# Patient Record
Sex: FEMALE | Race: WHITE | NOT HISPANIC OR LATINO | Employment: OTHER | URBAN - METROPOLITAN AREA
[De-identification: names, ages, dates, MRNs, and addresses within clinical notes are randomized per-mention and may not be internally consistent; named-entity substitution may affect disease eponyms.]

---

## 2017-01-19 ENCOUNTER — OFFICE VISIT - HEALTHEAST (OUTPATIENT)
Dept: BEHAVIORAL HEALTH | Facility: CLINIC | Age: 77
End: 2017-01-19

## 2017-01-19 DIAGNOSIS — F43.22 ADJUSTMENT DISORDER WITH ANXIOUS MOOD: ICD-10-CM

## 2017-01-19 DIAGNOSIS — F39 UNSPECIFIED EPISODIC MOOD DISORDER: ICD-10-CM

## 2017-01-19 DIAGNOSIS — Z60.3: ICD-10-CM

## 2017-01-19 DIAGNOSIS — F31.70 BIPOLAR DISORDER IN REMISSION (H): ICD-10-CM

## 2017-01-19 SDOH — SOCIAL STABILITY - SOCIAL INSECURITY: ACCULTURATION DIFFICULTY: Z60.3

## 2017-01-29 ENCOUNTER — COMMUNICATION - HEALTHEAST (OUTPATIENT)
Dept: BEHAVIORAL HEALTH | Facility: CLINIC | Age: 77
End: 2017-01-29

## 2017-01-29 DIAGNOSIS — F31.70 BIPOLAR DISORDER IN REMISSION (H): ICD-10-CM

## 2017-01-29 DIAGNOSIS — F41.9 ANXIETY: ICD-10-CM

## 2017-03-03 ENCOUNTER — COMMUNICATION - HEALTHEAST (OUTPATIENT)
Dept: BEHAVIORAL HEALTH | Facility: CLINIC | Age: 77
End: 2017-03-03

## 2017-03-03 DIAGNOSIS — F41.9 ANXIETY: ICD-10-CM

## 2017-03-03 DIAGNOSIS — F31.70 BIPOLAR DISORDER IN REMISSION (H): ICD-10-CM

## 2017-03-06 ENCOUNTER — COMMUNICATION - HEALTHEAST (OUTPATIENT)
Dept: BEHAVIORAL HEALTH | Facility: CLINIC | Age: 77
End: 2017-03-06

## 2017-03-06 DIAGNOSIS — F32.A DEPRESSION: ICD-10-CM

## 2017-03-14 ENCOUNTER — COMMUNICATION - HEALTHEAST (OUTPATIENT)
Dept: BEHAVIORAL HEALTH | Facility: CLINIC | Age: 77
End: 2017-03-14

## 2017-03-23 ENCOUNTER — COMMUNICATION - HEALTHEAST (OUTPATIENT)
Dept: BEHAVIORAL HEALTH | Facility: CLINIC | Age: 77
End: 2017-03-23

## 2017-05-25 ENCOUNTER — COMMUNICATION - HEALTHEAST (OUTPATIENT)
Dept: BEHAVIORAL HEALTH | Facility: CLINIC | Age: 77
End: 2017-05-25

## 2017-05-25 ENCOUNTER — OFFICE VISIT - HEALTHEAST (OUTPATIENT)
Dept: BEHAVIORAL HEALTH | Facility: CLINIC | Age: 77
End: 2017-05-25

## 2017-05-25 ENCOUNTER — AMBULATORY - HEALTHEAST (OUTPATIENT)
Dept: LAB | Facility: CLINIC | Age: 77
End: 2017-05-25

## 2017-05-25 DIAGNOSIS — F31.70 BIPOLAR DISORDER IN REMISSION (H): ICD-10-CM

## 2017-05-25 DIAGNOSIS — Z79.899 HIGH RISK MEDICATION USE: ICD-10-CM

## 2017-05-25 DIAGNOSIS — F32.A DEPRESSION: ICD-10-CM

## 2017-05-25 ASSESSMENT — MIFFLIN-ST. JEOR: SCORE: 1259.67

## 2017-08-28 ENCOUNTER — COMMUNICATION - HEALTHEAST (OUTPATIENT)
Dept: BEHAVIORAL HEALTH | Facility: CLINIC | Age: 77
End: 2017-08-28

## 2017-08-28 DIAGNOSIS — F31.70 BIPOLAR DISORDER IN REMISSION (H): ICD-10-CM

## 2017-08-28 DIAGNOSIS — Z79.899 HIGH RISK MEDICATION USE: ICD-10-CM

## 2017-09-21 ENCOUNTER — OFFICE VISIT - HEALTHEAST (OUTPATIENT)
Dept: BEHAVIORAL HEALTH | Facility: CLINIC | Age: 77
End: 2017-09-21

## 2017-09-21 DIAGNOSIS — F31.70 BIPOLAR DISORDER IN REMISSION (H): ICD-10-CM

## 2017-09-21 ASSESSMENT — MIFFLIN-ST. JEOR: SCORE: 1130.4

## 2018-03-13 ENCOUNTER — OFFICE VISIT - HEALTHEAST (OUTPATIENT)
Dept: BEHAVIORAL HEALTH | Facility: CLINIC | Age: 78
End: 2018-03-13

## 2018-03-13 DIAGNOSIS — Z60.3: ICD-10-CM

## 2018-03-13 DIAGNOSIS — Z79.899 HIGH RISK MEDICATION USE: ICD-10-CM

## 2018-03-13 DIAGNOSIS — F43.22 ADJUSTMENT DISORDER WITH ANXIOUS MOOD: ICD-10-CM

## 2018-03-13 DIAGNOSIS — F31.70 BIPOLAR DISORDER IN REMISSION (H): ICD-10-CM

## 2018-03-13 SDOH — SOCIAL STABILITY - SOCIAL INSECURITY: ACCULTURATION DIFFICULTY: Z60.3

## 2018-03-13 ASSESSMENT — MIFFLIN-ST. JEOR: SCORE: 1134.94

## 2018-04-24 ENCOUNTER — AMBULATORY - HEALTHEAST (OUTPATIENT)
Dept: BEHAVIORAL HEALTH | Facility: CLINIC | Age: 78
End: 2018-04-24

## 2018-04-24 ENCOUNTER — COMMUNICATION - HEALTHEAST (OUTPATIENT)
Dept: BEHAVIORAL HEALTH | Facility: CLINIC | Age: 78
End: 2018-04-24

## 2018-04-24 DIAGNOSIS — Z79.899 HIGH RISK MEDICATION USE: ICD-10-CM

## 2018-04-24 DIAGNOSIS — F31.70 BIPOLAR DISORDER IN REMISSION (H): ICD-10-CM

## 2018-09-11 ENCOUNTER — OFFICE VISIT - HEALTHEAST (OUTPATIENT)
Dept: BEHAVIORAL HEALTH | Facility: CLINIC | Age: 78
End: 2018-09-11

## 2018-09-11 DIAGNOSIS — F31.70 BIPOLAR DISORDER IN REMISSION (H): ICD-10-CM

## 2018-09-11 DIAGNOSIS — Z79.899 HIGH RISK MEDICATION USE: ICD-10-CM

## 2018-09-11 DIAGNOSIS — F43.21 GRIEF: ICD-10-CM

## 2018-09-11 ASSESSMENT — MIFFLIN-ST. JEOR: SCORE: 1144.01

## 2019-03-05 ENCOUNTER — OFFICE VISIT - HEALTHEAST (OUTPATIENT)
Dept: BEHAVIORAL HEALTH | Facility: CLINIC | Age: 79
End: 2019-03-05

## 2019-03-05 DIAGNOSIS — F31.70 BIPOLAR DISORDER IN REMISSION (H): ICD-10-CM

## 2019-03-05 DIAGNOSIS — K59.00 CONSTIPATION, UNSPECIFIED CONSTIPATION TYPE: ICD-10-CM

## 2019-03-05 DIAGNOSIS — Z79.899 HIGH RISK MEDICATION USE: ICD-10-CM

## 2019-03-05 ASSESSMENT — MIFFLIN-ST. JEOR: SCORE: 1157.61

## 2019-09-17 ENCOUNTER — OFFICE VISIT - HEALTHEAST (OUTPATIENT)
Dept: BEHAVIORAL HEALTH | Facility: CLINIC | Age: 79
End: 2019-09-17

## 2019-09-17 DIAGNOSIS — F31.70 BIPOLAR DISORDER IN REMISSION (H): ICD-10-CM

## 2019-09-17 DIAGNOSIS — Z79.899 HIGH RISK MEDICATION USE: ICD-10-CM

## 2019-09-17 DIAGNOSIS — G47.00 INSOMNIA, UNSPECIFIED TYPE: ICD-10-CM

## 2019-09-17 DIAGNOSIS — K59.00 CONSTIPATION, UNSPECIFIED CONSTIPATION TYPE: ICD-10-CM

## 2019-09-17 ASSESSMENT — ANXIETY QUESTIONNAIRES
GAD7 TOTAL SCORE: 7
7. FEELING AFRAID AS IF SOMETHING AWFUL MIGHT HAPPEN: SEVERAL DAYS
6. BECOMING EASILY ANNOYED OR IRRITABLE: SEVERAL DAYS
1. FEELING NERVOUS, ANXIOUS, OR ON EDGE: NOT AT ALL
4. TROUBLE RELAXING: MORE THAN HALF THE DAYS
2. NOT BEING ABLE TO STOP OR CONTROL WORRYING: SEVERAL DAYS
3. WORRYING TOO MUCH ABOUT DIFFERENT THINGS: MORE THAN HALF THE DAYS
5. BEING SO RESTLESS THAT IT IS HARD TO SIT STILL: NOT AT ALL

## 2019-09-17 ASSESSMENT — PATIENT HEALTH QUESTIONNAIRE - PHQ9: SUM OF ALL RESPONSES TO PHQ QUESTIONS 1-9: 1

## 2019-09-17 ASSESSMENT — MIFFLIN-ST. JEOR: SCORE: 1148.77

## 2020-08-18 ENCOUNTER — OFFICE VISIT - HEALTHEAST (OUTPATIENT)
Dept: BEHAVIORAL HEALTH | Facility: CLINIC | Age: 80
End: 2020-08-18

## 2020-08-18 DIAGNOSIS — F31.70 BIPOLAR DISORDER IN REMISSION (H): ICD-10-CM

## 2020-08-18 DIAGNOSIS — G47.00 INSOMNIA, UNSPECIFIED TYPE: ICD-10-CM

## 2020-08-18 DIAGNOSIS — Z79.899 HIGH RISK MEDICATION USE: ICD-10-CM

## 2020-08-18 DIAGNOSIS — F43.21 GRIEF: ICD-10-CM

## 2020-08-18 ASSESSMENT — MIFFLIN-ST. JEOR: SCORE: 1175.76

## 2021-01-15 ENCOUNTER — COMMUNICATION - HEALTHEAST (OUTPATIENT)
Dept: BEHAVIORAL HEALTH | Facility: CLINIC | Age: 81
End: 2021-01-15

## 2021-05-26 ASSESSMENT — PATIENT HEALTH QUESTIONNAIRE - PHQ9: SUM OF ALL RESPONSES TO PHQ QUESTIONS 1-9: 1

## 2021-05-28 ENCOUNTER — COMMUNICATION - HEALTHEAST (OUTPATIENT)
Dept: BEHAVIORAL HEALTH | Facility: CLINIC | Age: 81
End: 2021-05-28

## 2021-05-28 ASSESSMENT — ANXIETY QUESTIONNAIRES: GAD7 TOTAL SCORE: 7

## 2021-05-31 VITALS — WEIGHT: 161 LBS | BODY MASS INDEX: 23.85 KG/M2 | HEIGHT: 69 IN

## 2021-05-31 VITALS — WEIGHT: 164 LBS | BODY MASS INDEX: 32.2 KG/M2 | HEIGHT: 60 IN

## 2021-06-01 VITALS — HEIGHT: 60 IN | WEIGHT: 165 LBS | BODY MASS INDEX: 32.39 KG/M2

## 2021-06-01 NOTE — PROGRESS NOTES
OUTPATIENT PROGRESS NOTE      Date of visit 2019     There have not been many changes since last visit on   3/5/2019 in reasons for visit, issues, HPI, objective , subjective, family history, mental status examination, procedures and coordination of care, diagnosis and treatment plan, as the patient is stable in the context of compliance and regularly scheduled follow up, so the note was partially copied from previous visit.          [place] name  Alireza Kenyon    Reasons For Visit Are Multiple Today:   1.  Culturally sensitive follow-up for mental health issues  2. Depression: no symptoms   3. Anxiety : no symptoms  4. Insomnia : no symptoms, Trazodone helps  5. ARETHA inventory: score 7, but the patient denies all symptoms pertinent to anxiety, so elevated score could be due to just cultural differences or losses in translation  6. PHQ9 inventory score 1,  the patient denies symptoms pertinent for depression  7. [place] cultural issues: The interview is in [place] language  8.  Grief and loss issues:  of 59 years  last year, the patient has completed grief process and doing well in this regard at this time  9.  Ego activities: She attends [place]an-speaking adult  3-4 times per week  10.  Renewal of prescriptions  11.  Complaint of constipation: We discussed natural treatment modalities, see below, the patient occasionally takes MiraLAX and Dulcolax, limited response today  12.  Education on future follow-up appointments  13.  Education on length of treatment: I advised the patient to discontinue Effexor or at least to try to discontinue Effexor, in my opinion her multiple symptoms of depression will due to her caretakers burden as her  was very ill several years before his death, and she was his main caretaker.  However the patient is afraid to stop it, she has no side effects, and she wants to continue it without change, she does not want to risk the recurrence of  symptoms.      History of present illness/Subjective:  The patient is Welsh speaking. The interview is conducted in Welsh language, translated personally by me into English records which adds additional element of complexity to this visit and my assessment.  The patient is doing very well.  She is nicely dressed and groomed in typical [place] attire.  She is adequately engaging.  She denies side effects with medications.  She does not want to change her regimen.  She has pleasant demeanor.  She is very attentive, bright, but somewhat overly focused on the events of remote past today.  I allowed her to reflect on her life in [place] during communist times as it is very important in [place] immigrant culture, due to previous traumatic as well as pleasant experiences, and nostalgic feelings.  It is believed to be therapeutic for the patient.        Medications:      Current Outpatient Medications   Medication Sig Dispense Refill     aspirin 81 MG EC tablet Take 81 mg by mouth daily with supper. In the evening per the patient.       bisacodyl (DULCOLAX) 5 mg EC tablet Take 10 mg by mouth as needed.       cholecalciferol, vitamin D3, 1,000 unit tablet Take 1,000 Units by mouth daily.       cyanocobalamin 1000 MCG tablet Take 1,000 mcg by mouth daily.       docusate sodium (COLACE) 100 MG capsule Take 100 mg by mouth 2 (two) times a day.        flaxseed oil 1,000 mg cap Take 1 each by mouth 2 (two) times a day. 60 each 0     hydrochlorothiazide (MICROZIDE) 12.5 mg capsule Take 12.5 mg by mouth daily.       latanoprost (XALATAN) 0.005 % ophthalmic solution Apply 1 drop to eye at bedtime.       levothyroxine (SYNTHROID, LEVOTHROID) 75 MCG tablet Take 75 mcg by mouth daily.       lisinopril (PRINIVIL,ZESTRIL) 10 MG tablet Take 10 mg by mouth 2 (two) times a day.              polyethylene glycol (MIRALAX) 17 gram packet Take 17 g by mouth daily as needed.              psyllium husk (METAMUCIL) 0.4 gram cap Take 1 each by  mouth 2 (two) times a day. 60 capsule 0     traZODone (DESYREL) 50 MG tablet Take 50 mg by mouth. Per Dr. Loomis       venlafaxine (EFFEXOR XR) 75 MG 24 hr capsule Take 1 capsule (75 mg total) by mouth daily. 90 capsule 1     cyanocobalamin 1,000 mcg/mL injection Inject 1,000 mcg into the shoulder, thigh, or buttocks once. Per Dr. Loomis       No current facility-administered medications for this visit.          Family history/Social history  She lives alone.  She receives PCA services daily.          Procedures: Complex visit as multiple issues were addressed, total of  13 .  Total time today is 25 minutes, face to face and direct hands on patient's care in the clinic, more than 50% of time was coordination of care.   1. Coordination of care: nursing notes, vital signs,  communication with the pharmacy, and  medication orders were reviewed signed and discussed with the patient. Multiple chart entries were reviewed in preparation of documentation and generation of documentation.  2.  Education: was provided on diagnosis, medication regimen, psychotherapeutic treatment modalities, future follow-up appointments.  3.  Counseling: was provided on coping with mental illness.  4.  Coordination of care: I personally coordinated all medication orders, follow up orders, pharmacy requests, and provided the patient with detailed instructions in [place] language  5. [place] cultural and immigration issues were addressed, the interview was conducted in [place]arian language, Gibraltarian medications were discussed as it is common in [place] speaking community to take Russian produced medications which could be dangerous. The patient takes valerian root during spikes of blood pressure, which is a commonly used remedy in [place] culture    Review Of Systems:  As above.   The reminder of 10 systems was negative.      Vital Signs:    /84 (Patient Site: Left Arm, Patient Position: Sitting, Cuff Size: Adult Regular)   Pulse 72   Temp  98.1  F (36.7  C) (Oral)   Resp 16   Ht 5' (1.524 m)   Wt 168 lb 0.8 oz (76.2 kg)   BMI 32.82 kg/m      Mental Status Examination:     Appearance   Pleasant, nicely groomed.  Alert and oriented x3.  Alert and oriented ×3  Speech  fluent  Mood  good  Affect pleasant  Thought processing logical associations tight, thought content no SI/HI/psychosis.  No pathology.  Language normal.  Excellent short-term and long-term memory.  Fund of knowledge normal.  Psychomotor activity normal.  Gait and station are stable.  Insight and judgment are normal.        Laboratory Data:     personally reviewed.   No visits with results within 2 Month(s) from this visit.   Latest known visit with results is:   Lab on 05/25/2017   Component Date Value     Bilirubin, Total 05/25/2017 0.3      Bilirubin, Direct 05/25/2017 0.1      Protein, Total 05/25/2017 7.2      Albumin 05/25/2017 3.9      Alkaline Phosphatase 05/25/2017 70      AST 05/25/2017 16      ALT 05/25/2017 18      WBC 05/25/2017 8.7      RBC 05/25/2017 4.42      Hemoglobin 05/25/2017 13.6      Hematocrit 05/25/2017 43.2      MCV 05/25/2017 98      MCH 05/25/2017 30.8      MCHC 05/25/2017 31.5*     RDW 05/25/2017 12.8      Platelets 05/25/2017 300      MPV 05/25/2017 10.4      Neutrophils % 05/25/2017 59      Lymphocytes % 05/25/2017 30      Monocytes % 05/25/2017 9      Eosinophils % 05/25/2017 2      Basophils % 05/25/2017 0      Neutrophils Absolute 05/25/2017 5.1      Lymphocytes Absolute 05/25/2017 2.6      Monocytes Absolute 05/25/2017 0.8      Eosinophils Absolute 05/25/2017 0.2      Basophils Absolute 05/25/2017 0.0          Diagnosis:  No past medical history on file.    Patient Active Problem List   Diagnosis     Anxiety state, unspecified     Other dysfunctions of sleep stages or arousal from sleep       Visit diagnosis:  Bipolar disorder, in remission  Insomnia, stable on trazodone  Constipation      Treatment Plan:  1. The patient was provided with education and  detailed written and verbal instructions in native language on diagnosis, future follow-up, and treatment plan, same instructions were also provided in English language.   2. Instructed to return to clinic in 6 months or sooner if needed.  3. Nurse only clinic is available in the interim if needed.  4. Crisis plan in place.  5.   Continue Metamucil twice daily and flaxseed twice daily for constipation  6.  Follow-up with primary physician as previously scheduled Dr. Loomis   7.  We again discussed the risk of precipitation of derek when an antidepressant medication is used in bipolar depression unopposed with a mood stabilizer, but the patient continues to decline a mood stabilizing medication, she has been doing on this combination very well for at least couple of years, so we decided to continue it for now, and crisis plan was discussed, she will call the clinic in case of recurrence of symptoms of derek or mixed symptoms          This note was created by undersigned using a Dragon dictation system. All typing errors or contextual distortion are unintentional and software inherent.     Mili Rasmussen MD

## 2021-06-01 NOTE — PROGRESS NOTES
Reason for visit - follow-up, has issues to address with Dr. Rasmussen, not with nurse.  Sleep - dreams every night, sleep and will wake up, dreams are good.  Depression - low.  Anxiety - low.  Panic attacks - none.  SI/HI - denies.  Therapist - not for quite awhile.  Side effects from medication - none noted from the Venlafaxine.    No medication to report on [place] .

## 2021-06-01 NOTE — PROGRESS NOTES
Correct pharmacy verified with patient and confirmed in snapshot? [x] yes []no    Charge captured ? [x] yes  [] no    Medications Phoned  to Pharmacy [] yes [x]no  Name of Pharmacist:  List Medications, including dose, quantity and instructions      Medication Prescriptions given to patient   [] yes  [x] no   List the name of the drug the prescription was written for.       Medications ordered this visit were e-scribed.  Verified by order class [x] yes  [] no    Medication changes or discontinuations were communicated to patient's pharmacy: [] yes  [x] no, no medication discontinued.    UA collected [] yes  [x] no    [place] Prescription Monitoring Program Reviewed? [] yes  [x] no, no medication to monitor on the [place] .    Referrals were made to:  NA    Future appointment was made: [x] yes  [] no    Dictation completed at time of chart check: [x] yes  [] no    I have checked the documentation for today s encounters and the above information has been reviewed and completed.

## 2021-06-02 VITALS — WEIGHT: 170 LBS | BODY MASS INDEX: 33.38 KG/M2 | HEIGHT: 60 IN

## 2021-06-02 VITALS — BODY MASS INDEX: 32.79 KG/M2 | WEIGHT: 167 LBS | HEIGHT: 60 IN

## 2021-06-03 VITALS
WEIGHT: 168.05 LBS | DIASTOLIC BLOOD PRESSURE: 84 MMHG | RESPIRATION RATE: 16 BRPM | SYSTOLIC BLOOD PRESSURE: 140 MMHG | TEMPERATURE: 98.1 F | BODY MASS INDEX: 32.99 KG/M2 | HEART RATE: 72 BPM | HEIGHT: 60 IN

## 2021-06-04 VITALS
WEIGHT: 174 LBS | DIASTOLIC BLOOD PRESSURE: 68 MMHG | HEIGHT: 60 IN | HEART RATE: 67 BPM | SYSTOLIC BLOOD PRESSURE: 143 MMHG | BODY MASS INDEX: 34.16 KG/M2

## 2021-06-08 NOTE — PROGRESS NOTES
OUTPATIENT PROGRESS NOTE      Date of visit January 19, 2017           Reasons For Visit Are Multiple Today:   1. Recent visit of her son's family from [place], son, daughter-in-law, and 2 granddaughter's  2.  Problems with memory, forgetfulness, word finding difficulties  3.  Education on treatment plan  4.  Education on medication changes  5.  Symptoms of bipolar disorder: Stable at this time  6. Education on future follow up appointments  7.  Bulgarian and Temple cultural issues: Multiple concerns about 16 years old granddaughter Belle who is also diagnosed with depression and ADD and being treated for it  8. Limited stress tolerance, improved with resolution of affective dysregulation  9. Questions about medications        History of present illness/Subjective:  The patient is Bulgarian speaking. The interview is conducted in Bulgarian language, translated personally by me into English records which adds additional element of complexity to this visit and assessment. she reflects at length about on issues with her granddaughter, tearful, worries a lot about her, which is common in [place] Temple community, asking me a lot of questions about childhood depression and ADD.  So I allowed the patient to process her concerns and provided her with supportive interaction.  She also complains of short-term memory difficulties, forgetfulness, word finding difficulties.  We discussed the possibility of Klonopin contributing to the above symptoms.  I educated the patient on general contraindication for long-term use and use in elderly due to risk of accumulative effect, possibility of cognitive deficits, confusion, falls, rebound anxiety, seizures with abrupt discontinuation, potential for accumulation in elderly population.  I suggested to start tapering it.  Educated the patient on taper and risk of seizures with abrupt discontinuation, as she is known to often self manage her medications and doses.     the patient says that she  takes 0.5 mg twice per day for the most part, sometimes forgets to take morning dose.     Medications:      Current Outpatient Prescriptions   Medication Sig Dispense Refill     aspirin 81 MG EC tablet Take 81 mg by mouth daily with supper. In the evening per the patient.       cholecalciferol, vitamin D3, 1,000 unit tablet Take 1,000 Units by mouth daily.       clonazePAM (KLONOPIN) 0.5 MG tablet Take 1 tablet (0.5 mg total) by mouth 2 (two) times a day. 60 tablet 5     hydrochlorothiazide (MICROZIDE) 12.5 mg capsule Take 12.5 mg by mouth daily.       levothyroxine (SYNTHROID, LEVOTHROID) 75 MCG tablet Take 75 mcg by mouth daily.       lisinopril (PRINIVIL,ZESTRIL) 10 MG tablet Take 10 mg by mouth daily.       NON FORMULARY Arctostaphylos herb ([place])       simvastatin (ZOCOR) 40 MG tablet Take 40 mg by mouth bedtime.       venlafaxine (EFFEXOR-XR) 75 MG 24 hr capsule TAKE 1 CAPSULE EVERY DAY 30 capsule 0     No current facility-administered medications for this visit.          Family history/Social history   Lives with . Reports no physical abuse.  Improved relationships with improvement of her mental health symptoms..           Procedures:  1. Coordination of care: nursing notes, vital signs, multiple records of communication between the patient and the clinic, communication with the pharmacy, and multiple medication orders were reviewed signed and discussed with the patient. Multiple chart and Epic entries were reviewed and new Epic entries were submitted in preparation of documentation and generation of visit pertinent documentation.  Multiple related orders were entered.  2.  Education: was provided on diagnosis, medication regimen, psychotherapeutic treatment modalities, future follow-up appointments, recommended to continue medications without change for now due to severity of previous exacerbations.  3.  Counseling: was provided on coping with mental illness and caretaker's burden.   4.   "Coordination of care: I personally coordinated all medication orders, follow up orders, pharmacy requests, and provided the patient with detailed instructions in native [place] language.  5. [place] cultural and immigration issues were addressed, as it is common in [place]er speaking community to take Russian produced medications which could be dangerous. she reports no use of herbals at this time.     Review Of Systems:  As above. Also disturbing dreams. The reminder of 10 systems was negative.    LABORATORY DATE:  reviewed normal u/a, CBC, BMP on April 1st..    Vital Signs:    There were no vitals taken for this visit.    Mental Status Examination:     AppearancePleasant    Alert and oriented ×3    Attention and concentration  normal    Speech  normal    Orientation  X 3    Mood  described as doing well\"    Affect bit blunted    Thought processing  Logical     Associations  Logical     Thought content  Future oriented    Language  Normal in [place]    Short term memory   normal        Long term memory      normal    Fund of knowledge     normal    Psychomotor activity  normal    Gait and station  stable    Insight and judgment  improved        Limited Physical Examination:  The patient is not in acute physical distress, calm, stable on her feet, skin clear.      Laboratory Data:    personally reviewed.   No visits with results within 2 Month(s) from this visit.  Latest known visit with results is:    Orders Only on 06/18/2015   Component Date Value     Amphetamines 06/18/2015 Screen Negative      Benzodiazepines 06/18/2015 Screen Negative      Opiates 06/18/2015 Screen Positive (Confirmation available on request)*     Phencyclidine 06/18/2015 Screen Negative      THC 06/18/2015 Screen Negative      Barbiturates 06/18/2015 Screen Negative      Cocaine Metabolite 06/18/2015 Screen Negative      Methadone 06/18/2015 Screen Negative      Oxycodone 06/18/2015 Screen Negative      Creatinine, Urine 06/18/2015 47.9  "         Diagnosis:  No past medical history on file.    Patient Active Problem List   Diagnosis     Major depressive disorder, recurrent episode, moderate     Anxiety state, unspecified     Other dysfunctions of sleep stages or arousal from sleep     Bipolar 1 disorder, mixed, severe     Mood disorder in conditions classified elsewhere      bipolar disorder, in remission at this time    cognitive disorder NOS, could be due to side effects of long-term use of Klonopin    high risk medication use             Treatment Plan:  1. The patient was provided with education and detailed written and verbal instructions in native language on diagnosis, future follow-up, and treatment plan, same instructions were also provided in English language.   2. Instructed to return to clinic in 3 -4 months or sooner if needed.  3. Nurse only clinic  if necessary .  4. Crisis plan in place.  5. Continue Adult Day Care [place]ian speaking and culturally sensitive program [facility].  6.    Referral to Russian-speaking culturally sensitive psychotherapist doctor Dumont    7.  Start Klonopin taper, instructions are reflected below  8.  If and when Klonopin taper is completed successfully, and recurrence of symptoms of bipolar disorder is noted, consider addition of a standard mood stabilizer such as Depakote or atypical antipsychotic medication.   9.  We will order the nursing staff to call the pharmacy to cancel all previous orders for Klonopin and to ensure that the new orders are received  10.  I expect that some of her cognitive functioning will improve with discontinuation of Klonopin.  If cognitive deficits persist after discontinuation of Klonopin, we will consider more detailed cognitive/dementia workup  11.  The patient says that she just received a refill for Klonopin 0.5 mg twice a day 60 tablets and only used 5-6 tablets so far.     12. Start Klonopin taper: Instead of 0.5 mg 1 tablet twice per day, take one tablet in the morning  and half tablet at night for 1 month.  Then obtain a new prescription for Klonopin 0.5 mg on February 19th 2016 and take half tablet in the morning and half tablet at night for 1 month.  Then take Klonopin 0.5 mg half tablet once per day for 2 months, then stop.    13.  I prescribed Klonopin 0.5 mg 60 tabs to get on Feb 19th. It should suffice for the rest of the taper.  She is to receive this prescription on February 19.      Patient's instructions:  1.  Start Klonopin taper:   Instead of 0.5 mg 1 tablet twice per day take one tablet in the morning and half tablet at night for 1 month.  Then obtain a new prescription for Klonopin 0.5 mg on February 19th 2016 and take half tablet in the morning and half tablet at night for 1 month.  Then take Klonopin 0.5 mg half tablet once per day for 2 months, then stop.  2.  Continue Effexor without change for now  3.  Call clinic in one-2 months to update on progress.  Call clinic anytime during day if worsening of anxiety or other discomfort with Klonopin taper  4.  Follow-up with me in 3-4 months    Complex visit, multiple issues were addressed as reflected above, coordination of care, education, counseling,     [place] cultural issues, multiple visit entries were reviewed and submitted for generation  of pertinent documentation, as fully reflected in the procedures paragraph. Please see associated nursing records for other details.        This note was created with help of Dragon dictation system. Grammatical / typing errors are not intentional.    Mili Rasmussen MD

## 2021-06-10 NOTE — PROGRESS NOTES
Pt is here for culturally sensitive psychiatric med management follow up. She reports stable mood, says she stopped Venlafaxine a while ago. Trazodone prescribed by her PCP Dr. Loomis. Still complains of having vivid dreams.     Correct pharmacy verified with patient and confirmed in snapshot? [x] yes []no    Charge captured ? [x] yes  [] no    Medications Phoned  to Pharmacy [] yes [x]no  Name of Pharmacist:  List Medications, including dose, quantity and instructions      Medication Prescriptions given to patient   [] yes  [x] no   List the name of the drug the prescription was written for.       Medications ordered this visit were e-scribed.  Verified by order class [] yes  [x] no    Medication changes or discontinuations were communicated to patient's pharmacy: [] yes  [x] no    UA collected [] yes  [x] no    [place] Prescription Monitoring Program Reviewed? [] yes  [x] no    Referrals were made to:  none  Future appointment was made: [x] yes  [] no  2/16/21  Dictation completed at time of chart check: [x] yes  [] no    I have checked the documentation for today s encounters and the above information has been reviewed and completed.

## 2021-06-10 NOTE — PROGRESS NOTES
Pt called to clarify the Depakote:  1. She wanted to make sure she can take Divalproex instead of Depakote. Explained that divalproex is generic for Depakote  2. Pt said that Dr. Rasmussen told her to take it two times per day but directions on the bottle states three times a day. Clarified with provider. Pt should be taking Depakote 125 mg BID. Informed pt via phone, corrected Rx pended for provider to review and sign.

## 2021-06-10 NOTE — PROGRESS NOTES
Client is here for culturally sensitive psychiatric med management follow up.   She took her last dose of Klonopin last night. Initially, she was nervous about tapering off the medicine but now she said she is fine with it.  Pt reports ongoing stress related to her , his physical health and memory. Her  Corbin hides stuff, can't find things . He also has seasonal allergies.  Pt says her appetite has been better. Mood is stable      Health Information [place] Date: 17  [employer] Query Report Page#: 1  Patient Rx History Report  YOVANI MENDEZ  Search Criteria: Last Name 'Yovani' and First Name 'Alireza' and  =  and Request Period =   to  - 2 out of 2 Recipients Selected.  Fill Date Product, Str, Form Qty Days Pt ID Prescriber Written RX# N/R* Pharm **MED+  ---------- -------------------------------- ------ ---- --------- ---------- ---------- ------------ ----- --------- ------  2017 CLONAZEPAM 0.5 MG TABLET 30.00 30 [id number] 2017 [id number] R [id number] 00.0  2017 CLONAZEPAM 0.5 MG TABLET 30.00 30 [id number] 2017 [id number] N [id number] 00.0  2017 CLONAZEPAM 0.5 MG TABLET 60.00 30 [id number] 2016 [id number] R [id number] 00.0  2016 CLONAZEPAM 0.5 MG TABLET 60.00 30 [id number] 2016 [id number] R [id number] 00.0  *N/R N=New R=Refill  +MED Daily  Prescribers for prescriptions listed  ----------------------------------------------------------------------------------------------------------------------------------  [id number] LOLY NEWTON; [facility] [address]  Pharmacies that dispensed prescriptions listed  ----------------------------------------------------------------------------------------------------------------------------------  [id number] [facility] : [id number],  [address], [facility]  [address],  Patients that match search criteria  ----------------------------------------------------------------------------------------------------------------------------------  [id number] YOVANI MENDEZ,  38; [address] [facility] [address]  [id number] YOVANI MENDEZ,  [birth year]; [address] [facility] [address]  MED Summary  This section displays cumulative MED values by unique recipient. The MED Max value is the maximum occurrence of cumulative MED  sustained for any 3 consecutive days. This value is calculated based on prescriptions dispensed during the date range requested.  -----------------------------------------------------------------------------------------------------------------------------------  0 VANESSA PINA; 1940[facility] [address] [facility] [address]    Correct pharmacy verified with patient and confirmed in snapshot? [x] yes []no    Medications Phoned  to Pharmacy [] yes [x]no  Name of Pharmacist:  List Medications, including dose, quantity and instructions      Medication Prescriptions given to patient   [] yes  [x] no   List the name of the drug the prescription was written for.       Medications ordered this visit were e-scribed.  Verified by order class [x] yes  [] no  Depakote and Effexor 75 mg  Medication changes or discontinuations were communicated to patient's pharmacy: [x] yes  [] no  LM for [facility] updating of the Effexor XR dose decrease to 75 mg /day and asking to deactivate all previus orders.   UA collected [] yes    [x] no    [place] Prescription Monitoring Program Reviewed? [x] yes  [] no    Referrals were made to:  none    Future appointment was made: [x] yes  [] no  17  Dictation completed at time of chart check: [x] yes  [] no    I have checked the documentation for today s encounters and the above information has been reviewed and completed.

## 2021-06-10 NOTE — PROGRESS NOTES
OUTPATIENT PROGRESS NOTE      Date of visit May 25, 2017           Reasons For Visit Are Multiple Today:   1. High risk medication use, the patient is on Klonopin taper  2.  Problems with memory, forgetfulness, word finding difficulties reported during last visit, so Klonopin taper was initiated, improvement is noted  3.  Education on treatment plan  4.  Education on medication changes  5.  Symptoms of bipolar disorder: Stable at this time  6. Education on future follow up appointments  7.  Ukrainian and Alevism cultural issues  8.  Family relationships, Aricept  was just prescribed to her   9. Intermittent problems with sleep - PRN Melatonin helps      History of present illness/Subjective:  The patient is Ukrainian speaking. The interview is conducted in Ukrainian language, translated personally by me into English records which adds additional element of complexity to this visit and assessment. Issues are reported and discussed as above. She again reflects at length on health issues of her  which is common to reflect on in [place] Alevism tradition, so I allowed the patient to process it and provided with supportive interaction. We discussed Klonopin taper, she tolerates it well.  I educated the patient on general contraindication for long-term use and use in elderly due to risk of accumulative effect, possibility of cognitive deficits, confusion, falls, rebound anxiety, seizures with abrupt discontinuation, potential for accumulation in elderly population.  I suggested to start tapering it.  Educated the patient on taper and risk of seizures with abrupt discontinuation, as she is known to often self manage her medications and doses.     the patient says that she was taking 0.5 mg  1/2 tablet at night for 2 nonths, last dose yesterday. So we also risk of derek without Klonopin or other mood stabilizer if antidepressant is unopposed, the patient does not want to stop Effexor, and not sure about different mood  stabilizer, but is willing to decrease Effexor.     Medications:      Current Outpatient Prescriptions   Medication Sig Dispense Refill     aspirin 81 MG EC tablet Take 81 mg by mouth daily with supper. In the evening per the patient.       cholecalciferol, vitamin D3, 1,000 unit tablet Take 1,000 Units by mouth daily.       cyanocobalamin 1,000 mcg/mL injection Inject 1,000 mcg into the shoulder, thigh, or buttocks once. Per Dr. Loomis       hydrochlorothiazide (MICROZIDE) 12.5 mg capsule Take 12.5 mg by mouth daily.       levothyroxine (SYNTHROID, LEVOTHROID) 75 MCG tablet Take 75 mcg by mouth daily.       lisinopril (PRINIVIL,ZESTRIL) 10 MG tablet Take 10 mg by mouth daily.       simvastatin (ZOCOR) 40 MG tablet Take 40 mg by mouth bedtime.       venlafaxine (EFFEXOR-XR) 150 MG 24 hr capsule Take 1 capsule (150 mg total) by mouth daily. 90 capsule 0     No current facility-administered medications for this visit.          Family history/Social history   Lives with . Reports no physical abuse.  Improved relationships with improvement of her mental health symptoms..           Procedures:  1. Coordination of care: nursing notes, vital signs, multiple records of communication between the patient and the clinic, communication with the pharmacy, and multiple medication orders were reviewed signed and discussed with the patient. Multiple chart and Epic entries were reviewed and new Epic entries were submitted in preparation of documentation and generation of visit pertinent documentation.  Multiple related orders were entered.  2.  Education: was provided on diagnosis, medication regimen, psychotherapeutic treatment modalities, future follow-up appointments, recommended to continue medications without change for now due to severity of previous exacerbations.  3.  Counseling: was provided on coping with mental illness and caretaker's burden.   4.  Coordination of care: I personally coordinated all medication  "orders, follow up orders, pharmacy requests, and provided the patient with detailed instructions in native Congolese language.  5. Congolese cultural and immigration issues were addressed, as it is common in Congolese speaking community to take Russian produced medications which could be dangerous. she reports no use of herbals at this time.     Review Of Systems:  As above. Also disturbing dreams. The reminder of 10 systems was negative.    LABORATORY DATE:  reviewed normal u/a, CBC, BMP on April 1st..    Vital Signs:    /70 (Patient Site: Left Arm, Patient Position: Sitting, Cuff Size: Adult Regular)  Temp 97.5  F (36.4  C) (Oral)   Ht 5' 9\" (1.753 m)  Wt 161 lb (73 kg)  BMI 23.78 kg/m2    Mental Status Examination:     AppearancePleasant    Alert and oriented ×3    Attention and concentration  normal    Speech  normal    Orientation  X 3    Mood  described as \" doing well\"    Affect bit blunted    Thought processing  Logical     Associations  Logical     Thought content  Future oriented    Language  Normal in Congolese    Short term memory   normal        Long term memory      normal    Fund of knowledge     normal    Psychomotor activity  normal    Gait and station  stable    Insight and judgment  improved        Limited Physical Examination:  The patient is not in acute physical distress, calm, stable on her feet, skin clear.      Laboratory Data:    personally reviewed.   No visits with results within 2 Month(s) from this visit.  Latest known visit with results is:    Orders Only on 06/18/2015   Component Date Value     Amphetamines 06/18/2015 Screen Negative      Benzodiazepines 06/18/2015 Screen Negative      Opiates 06/18/2015 Screen Positive (Confirmation available on request)*     Phencyclidine 06/18/2015 Screen Negative      THC 06/18/2015 Screen Negative      Barbiturates 06/18/2015 Screen Negative      Cocaine Metabolite 06/18/2015 Screen Negative      Methadone 06/18/2015 Screen Negative      " Oxycodone 06/18/2015 Screen Negative      Creatinine, Urine 06/18/2015 47.9          Diagnosis:  No past medical history on file.    Patient Active Problem List   Diagnosis     Major depressive disorder, recurrent episode, moderate     Anxiety state, unspecified     Other dysfunctions of sleep stages or arousal from sleep     Bipolar 1 disorder, mixed, severe     Mood disorder in conditions classified elsewhere      bipolar disorder, in remission at this time    cognitive disorder NOS, could be due to side effects of long-term use of Klonopin    high risk medication use             Treatment Plan:  1. The patient was provided with education and detailed written and verbal instructions in native language on diagnosis, future follow-up, and treatment plan, same instructions were also provided in English language.   2. Instructed to return to clinic in 6 months or sooner if needed.  3. Nurse only clinic  if necessary .  4. Crisis plan in place.  5. Continue Adult Day Care [place] speaking and culturally sensitive program [facility].  6.    Referral to Russian-speaking culturally sensitive psychotherapist doctor Patel, continue sessions   7. Consider addition of a standard mood stabilizer such as Depakote or atypical antipsychotic medication, we discussed both medications risks and benefits potential side effects, she is not interested in Seroquel but she will think about Depakote 125 mg BID, she verbalized adequate understanding of potential side effect  8. Decrease Effexor XR 75 mg - no current symptoms of depression or other signs/symptoms of affective dysregulation, risk of precipitation of derek  9. Check LFTs, CBC baseline for Depakote      Complex visit, multiple issues were addressed as reflected above, coordination of care, education, counseling,     [place] cultural issues, multiple visit entries were reviewed and submitted for generation  of pertinent documentation, as fully reflected in the procedures  paragraph. Please see associated nursing records for other details.        This note was created with help of Dragon dictation system. Grammatical / typing errors are not intentional.    Mili Rasmussen MD

## 2021-06-10 NOTE — PROGRESS NOTES
OUTPATIENT PROGRESS NOTE      Date of visit 2020     There have not been many changes since last visit on   2019 in reasons for visit, issues, HPI, objective , subjective, family history, mental status examination, procedures and coordination of care, diagnosis and treatment plan, as the patient is stable in the context of compliance and regularly scheduled follow up, so the note was partially copied from previous visit.          Greenlandic name  Alireza Kenyon    Reasons For Visit Are Multiple Today: Psychiatric follow-up in the context of COVID-19 global pandemic's and emergency declared by the government  1.  Culturally sensitive follow-up for mental health issues  2. Depression: no symptoms   3. Anxiety : no symptoms  4. Insomnia : no symptoms, Trazodone helps. She continues to report vivid dreams  5. Greenlandic cultural issues: The interview is in Greenlandic language.  Family issues were discussed as it is customary in [place] immigrant community and important from therapeutic point of view.  The patient reflects at length on both days celebrations  of her various family members  6.  Grief and loss issues:  of 56 years  2 years ago, the patient has completed grief process and doing well in this regard at this time  7.  Ego activities: Very limited at this time due to social isolation and alcohol at 19 pandemic's, she takes walks, she likes baking and cooking.  She also likes to sing, and the she again sang today attention program daily gets discharged to new work multiple  10.  Renewal of prescriptions-trazodone is being prescribed by her primary physician Dr. Loomis, so I will not submit prescriptions, will try to keep it centralized to avoid duplicates.  She self stopped Effexor, she says that her mood is good and neutral and she does not need it any longer.  11.   Education on future follow-up appointments  12.  Stressors: Social isolation due to COVID-19 pandemic's      History of present illness/Subjective:  The patient is Senegalese speaking. The interview is conducted in Senegalese language, translated personally by me into English records which adds additional element of complexity to this visit and my assessment.  The patient is doing very well.  She is nicely dressed and groomed in typical [place]eg[place]e attire.  She is adequately engaging.  She denies side effects with medications.  She does not want to change her regimen.  She has a very pleasant demeanor.  She is very attentive, bright.  No behavioral or stated evidence of mood symptoms/anxiety/SI/HI/psychosis.        Medications:      Current Outpatient Medications   Medication Sig Dispense Refill     aspirin 81 MG EC tablet Take 81 mg by mouth daily with supper. In the evening per the patient.       cholecalciferol, vitamin D3, 1,000 unit tablet Take 1,000 Units by mouth daily.       hydroCHLOROthiazide (HYDRODIURIL) 25 MG tablet Take 25 mg by mouth every morning.       latanoprost (XALATAN) 0.005 % ophthalmic solution Apply 1 drop to eye at bedtime.       levothyroxine (SYNTHROID, LEVOTHROID) 75 MCG tablet Take 75 mcg by mouth daily.       lisinopriL (PRINIVIL,ZESTRIL) 20 MG tablet Take 20 mg by mouth daily.       polyethylene glycol (MIRALAX) 17 gram packet Take 17 g by mouth daily as needed.              psyllium husk (METAMUCIL) 0.4 gram cap Take 1 each by mouth 2 (two) times a day. 60 capsule 0     rosuvastatin (CRESTOR) 10 MG tablet Take 10 mg by mouth at bedtime.       traZODone (DESYREL) 50 MG tablet Take 50 mg by mouth. Per Dr. Loomis       cyanocobalamin 1,000 mcg/mL injection Inject 1,000 mcg into the shoulder, thigh, or buttocks once. Per Dr. Loomis       cyanocobalamin 1000 MCG tablet Take 1,000 mcg by mouth daily.       venlafaxine (EFFEXOR XR) 75 MG 24 hr capsule Take 1 capsule (75 mg total) by mouth daily. 90 capsule 1     No current facility-administered medications for this visit.          Family  history/Social history  She lives alone.  She receives PCA services daily.          Procedures: Complex visit as multiple issues were addressed, total of  12 .  Total time today is 25 minutes, face to face and direct hands on patient's care in the clinic, more than 50% of time was coordination of care, Mongolian cultural issues, family issues, COVID-19 pandemic's.   1. Coordination of care: nursing notes, vital signs,  communication with the pharmacy, and  medication orders were reviewed signed and discussed with the patient. Multiple chart entries were reviewed in preparation of documentation and generation of documentation.  2.  Education: was provided on diagnosis, medication regimen, psychotherapeutic treatment modalities, future follow-up appointments.  3.  Counseling: was provided on coping with mental illness.  4.  Coordination of care: I personally coordinated all medication orders, follow up orders, pharmacy requests, and provided the patient with detailed instructions in Mongolian language  5. Mongolian cultural and immigration issues were addressed, the interview was conducted in Mongolian language    Review Of Systems:  As above.   The reminder of 10 systems was negative.      Vital Signs:    /68 (Patient Site: Left Arm, Patient Position: Sitting, Cuff Size: Adult Large)   Pulse 67   Ht 5' (1.524 m)   Wt 174 lb (78.9 kg)   BMI 33.98 kg/m      Mental Status Examination:     Appearance   Pleasant, nicely groomed.  Alert and oriented x3.  Alert and oriented ×3  Speech  fluent  Mood  good  Affect pleasant  Thought processing logical associations tight, thought content no SI/HI/psychosis.  No pathology.  Language normal.  Excellent short-term and long-term memory.  Fund of knowledge normal.  Psychomotor activity normal.  Gait and station are stable.  Insight and judgment are normal.        Laboratory Data:     personally reviewed.   No visits with results within 2 Month(s) from this visit.   Latest known  visit with results is:   Lab on 05/25/2017   Component Date Value     Bilirubin, Total 05/25/2017 0.3      Bilirubin, Direct 05/25/2017 0.1      Protein, Total 05/25/2017 7.2      Albumin 05/25/2017 3.9      Alkaline Phosphatase 05/25/2017 70      AST 05/25/2017 16      ALT 05/25/2017 18      WBC 05/25/2017 8.7      RBC 05/25/2017 4.42      Hemoglobin 05/25/2017 13.6      Hematocrit 05/25/2017 43.2      MCV 05/25/2017 98      MCH 05/25/2017 30.8      MCHC 05/25/2017 31.5*     RDW 05/25/2017 12.8      Platelets 05/25/2017 300      MPV 05/25/2017 10.4      Neutrophils % 05/25/2017 59      Lymphocytes % 05/25/2017 30      Monocytes % 05/25/2017 9      Eosinophils % 05/25/2017 2      Basophils % 05/25/2017 0      Neutrophils Absolute 05/25/2017 5.1      Lymphocytes Absolute 05/25/2017 2.6      Monocytes Absolute 05/25/2017 0.8      Eosinophils Absolute 05/25/2017 0.2      Basophils Absolute 05/25/2017 0.0          Diagnosis:  No past medical history on file.    Patient Active Problem List   Diagnosis     Anxiety state, unspecified     Other dysfunctions of sleep stages or arousal from sleep       Visit diagnosis:  Bipolar disorder, in remission  Insomnia, stable on trazodone  Constipation      Treatment Plan:  1. The patient was provided with education and detailed written and verbal instructions in native language on diagnosis, future follow-up, and treatment plan, same instructions were also provided in English language.   2. Instructed to return to clinic in 6-12 months or sooner if needed.  3. Crisis plan in place.  4.  Follow-up with primary physician as previously scheduled Dr. Loomis             This note was created by undersigned using a Dragon dictation system. All typing errors or contextual distortion are unintentional and software inherent.     Mili Rasmussen MD

## 2021-06-13 NOTE — PROGRESS NOTES
"Assisted today by [place] interpretor, Ayo. \"Not bad\". Has been off Depakote since August, says she feels better.  is in nursing home, says she feels less sad about that, rick less. Says her appetite is better.  Requests refill of  Venlafaxine today, pended for Dr. aRsmussen.        Correct pharmacy verified with patient and confirmed in snapshot? [x] yes []no    Charge captured ? [x] yes  [] no    Medications Phoned  to Pharmacy [] yes [x]no  Name of Pharmacist:  List Medications, including dose, quantity and instructions      Medication Prescriptions given to patient   [] yes  [x] no   List the name of the drug the prescription was written for.       Medications ordered this visit were e-scribed.  Verified by order class [] yes  [x] no    Medication changes or discontinuations were communicated to patient's pharmacy: [] yes  [x] no    UA collected [] yes  [x] no    [place] Prescription Monitoring Program Reviewed? [x] yes  [] no    Referrals were made to:  none  Future appointment was made: [x] yes  [] no    Dictation completed at time of chart check: [x] yes  [] no    I have checked the documentation for today s encounters and the above information has been reviewed and completed.    "

## 2021-06-13 NOTE — PROGRESS NOTES
OUTPATIENT PROGRESS NOTE      Date of visit September 21, 2017           Reasons For Visit Are Multiple Today:   1. High risk medication use, the patient completed Klonopin taper last visit  2.  Trial of Depakote - the patient has self stopped it, and says that she is doing better without it, says that she had problems with balance with it, and that her PMD Dr. Loomis advised  her to stop it because she has no indication for it and that it is only used for epilepsy - at least this how she understood his advise and explanation   3.  Education on treatment plan  4.  Education on medication changes  5.  Symptoms of bipolar disorder: Stable at this time  6. Education on future follow up appointments  7.  Mauritian and Tenriism cultural issues, interview in Mauritian language  8.  Family relationships and family issues: the patient reflects at length on recent changes,  was hospitalized on psychiatric unit, now he is at the nursing home  9.  Current medications: she is on Effexor only, asymptomatic, and does not want any mood stabilizer  10. Explicit education on risks of precipitation of derek if antidepressant is unopposed with mood stabilizer in Bipolar disorder, the patient verbalized adequate understanding and we discussed crisis plan  11. Cognitive issues: improved and resolved since Klonopin was tapered off      History of present illness/Subjective:  The patient is Mauritian speaking. The interview is conducted in Mauritian language, translated personally by me into English records which adds additional element of complexity to this visit and assessment. Issues are reported and discussed as above. She again reflects at length on health issues of her  which is common to reflect on in [place] Tenriism tradition, so I allowed the patient to process it and provided with supportive interaction.   She denies any sustained mood symptoms, denies depression, anxiety.  No behavioral stated evidence of symptoms of derek  at this time.    Medications:      Current Outpatient Prescriptions   Medication Sig Dispense Refill     aspirin 81 MG EC tablet Take 81 mg by mouth daily with supper. In the evening per the patient.       cholecalciferol, vitamin D3, 1,000 unit tablet Take 1,000 Units by mouth daily.       cyanocobalamin 1,000 mcg/mL injection Inject 1,000 mcg into the shoulder, thigh, or buttocks once. Per Dr. Loomis       hydrochlorothiazide (MICROZIDE) 12.5 mg capsule Take 12.5 mg by mouth daily.       levothyroxine (SYNTHROID, LEVOTHROID) 75 MCG tablet Take 75 mcg by mouth daily.       lisinopril (PRINIVIL,ZESTRIL) 10 MG tablet Take 10 mg by mouth daily.       melatonin 5 mg cap Take by mouth.       simvastatin (ZOCOR) 40 MG tablet Take 40 mg by mouth bedtime.       venlafaxine (EFFEXOR XR) 75 MG 24 hr capsule Take 1 capsule (75 mg total) by mouth daily. 90 capsule 1     divalproex (DEPAKOTE) 125 MG EC tablet Take 1 tablet (125 mg total) by mouth 2 (two) times a day. 60 tablet 5     No current facility-administered medications for this visit.          Family history/Social history   Lives with . Reports no physical abuse.  Improved relationships with improvement of her mental health symptoms..           Procedures:  1. Coordination of care: nursing notes, vital signs, multiple records of communication between the patient and the clinic, communication with the pharmacy, and multiple medication orders were reviewed signed and discussed with the patient. Multiple chart and Epic entries were reviewed and new Epic entries were submitted in preparation of documentation and generation of visit pertinent documentation.  Multiple related orders were entered.  2.  Education: was provided on diagnosis, medication regimen, psychotherapeutic treatment modalities, future follow-up appointments, recommended to continue medications without change for now due to severity of previous exacerbations.  3.  Counseling: was provided on coping  "with mental illness and caretaker's burden.   4.  Coordination of care: I personally coordinated all medication orders, follow up orders, pharmacy requests, and provided the patient with detailed instructions in native Ukrainian language.  5. Ukrainian cultural and immigration issues were addressed, as it is common in Ukrainian speaking community to take Russian produced medications which could be dangerous. she reports no use of herbals or other Ukrainian Yazidism folk remedies at this time.     Review Of Systems:  As above. Also disturbing dreams. The reminder of 10 systems was negative.    LABORATORY DATE:  reviewed normal u/a, CBC, BMP on April 1st..    Vital Signs:    /81 (Patient Site: Left Arm, Patient Position: Sitting, Cuff Size: Adult Regular)  Pulse 73  Temp 98.5  F (36.9  C) (Oral)   Ht 5' (1.524 m)  Wt 164 lb (74.4 kg)  BMI 32.03 kg/m2    Mental Status Examination:     AppearancePleasant    Alert and oriented ×3    Attention and concentration  normal    Speech  Normal, except a bit hyperverbal which is known to be patient's baseline    Orientation  X 3    Mood  described as \" doing well\"    Affect bit blunted    Thought processing  Logical     Associations  Logical     Thought content  Future oriented    Language  Normal in Ukrainian    Short term memory   normal        Long term memory      normal    Fund of knowledge     normal    Psychomotor activity  normal    Gait and station  stable    Insight and judgment  improved        Limited Physical Examination:  The patient is not in acute physical distress, calm, stable on her feet, skin clear.      Laboratory Data:    personally reviewed.   No visits with results within 2 Month(s) from this visit.  Latest known visit with results is:    Lab on 05/25/2017   Component Date Value     Bilirubin, Total 05/25/2017 0.3      Bilirubin, Direct 05/25/2017 0.1      Protein, Total 05/25/2017 7.2      Albumin 05/25/2017 3.9      Alkaline Phosphatase 05/25/2017 70      " AST 05/25/2017 16      ALT 05/25/2017 18      WBC 05/25/2017 8.7      RBC 05/25/2017 4.42      Hemoglobin 05/25/2017 13.6      Hematocrit 05/25/2017 43.2      MCV 05/25/2017 98      MCH 05/25/2017 30.8      MCHC 05/25/2017 31.5*     RDW 05/25/2017 12.8      Platelets 05/25/2017 300      MPV 05/25/2017 10.4      Neutrophils % 05/25/2017 59      Lymphocytes % 05/25/2017 30      Monocytes % 05/25/2017 9      Eosinophils % 05/25/2017 2      Basophils % 05/25/2017 0      Neutrophils Absolute 05/25/2017 5.1      Lymphocytes Absolute 05/25/2017 2.6      Monocytes Absolute 05/25/2017 0.8      Eosinophils Absolute 05/25/2017 0.2      Basophils Absolute 05/25/2017 0.0          Diagnosis:  No past medical history on file.    Patient Active Problem List   Diagnosis     Major depressive disorder, recurrent episode, moderate     Anxiety state, unspecified     Other dysfunctions of sleep stages or arousal from sleep     Bipolar 1 disorder, mixed, severe     Mood disorder in conditions classified elsewhere      bipolar disorder, in remission at this time    cognitive disorder NOS, likely due to side effects of long-term use of Klonopin, resolved with discontinuation of Klonopin   high risk medication use             Treatment Plan:  1. The patient was provided with education and detailed written and verbal instructions in native language on diagnosis, future follow-up, and treatment plan, same instructions were also provided in English language.   2. Instructed to return to clinic in 6 months or sooner if needed.  3. Nurse only clinic  if necessary .  4. Crisis plan in place.  5. Continue Adult Day Care Bangladeshi speaking and culturally sensitive program [facility].  6.    Referral to Russian-speaking culturally sensitive psychotherapist doctor Patel, continue sessions   7. No changes in Effexor XR 75 mg - risk of precipitation of derek was discussed as reflected above        Complex visit, multiple issues were addressed as  reflected above, coordination of care, education, counseling,  Total time 40 minutes, more than 50% coordination of care.   Cymro cultural issues, multiple visit entries were reviewed and submitted for generation  of pertinent documentation, as fully reflected in the procedures paragraph. Please see associated nursing records for other details.        This note was created with help of Dragon dictation system. Grammatical / typing errors are not intentional.    Mili Rasmussen MD

## 2021-06-14 NOTE — TELEPHONE ENCOUNTER
Spoke to pt and she is not interested in seeing another provider. Reports stopping Venlafaxine, had no mental health complains today, was r/s for 6/22/21 with Dr. Rasmussen, and advised to call with any concerns.

## 2021-06-14 NOTE — TELEPHONE ENCOUNTER
Left VM updating the pt that we are cancelling appt with Dr. Rasmussen on 2/16/21, offering to schedule with another provider and asking her to contact the clinic to let us know if she wants to r/s with Grady or set up an appt with a new provider.

## 2021-06-16 NOTE — PROGRESS NOTES
OUTPATIENT PROGRESS NOTE      Date of visit March 13, 2018           Reasons For Visit Are Multiple Today: culturally sensitive follow up for multiple issues  1. Stressor:  is at a nursing home, she visits him every other day, she brings him home made Russian meals which is traditional in our culture  2.  Trial of Trazodone for sleep, prescribed by PMD DR. Loomis instead of melatonin which was not effective, now her sleep has improved  3.  Education on treatment plan  4.  Education on  Follow up appointments  5.  Symptoms of bipolar disorder: Stable at this time  6. Family relationships and family issues: the patient reflects at length on recent changes,  was hospitalized on psychiatric unit, now he is at the nursing home  7.  South [place]ese and Caodaism cultural issues, interview in [place] language  8.  Current medications: she is on Effexor and Trazodone, asymptomatic, and does not want any mood stabilizer  9.  Explicit education on risks of precipitation of derek if antidepressant is unopposed with mood stabilizer in Bipolar disorder, the patient verbalized adequate understanding and we discussed crisis plan   10. Cognitive issues: improved and resolved since Klonopin was tapered off she recites all her medications and doses, she worked as a nurse prior to immigration        History of present illness/Subjective:  The patient is [place]ese speaking. The interview is conducted in [place]ese language, translated personally by me into English records which adds additional element of complexity to this visit and assessment. Issues are reported and discussed as above. She again reflects at length on health issues of her  which is common to reflect on in [place] Caodaism tradition, so I allowed the patient to process it and provided with supportive interaction. She is also talking at length about her 18 y o granddaughter who is also treated for mental illness.  She denies any sustained mood symptoms, denies  depression, anxiety.  No behavioral stated evidence of symptoms of derek at this time.    Medications:      Current Outpatient Prescriptions   Medication Sig Dispense Refill     aspirin 81 MG EC tablet Take 81 mg by mouth daily with supper. In the evening per the patient.       atorvastatin (LIPITOR) 40 MG tablet Take 40 mg by mouth.       cholecalciferol, vitamin D3, 1,000 unit tablet Take 1,000 Units by mouth daily.       docusate sodium (COLACE) 100 MG capsule Take 100 mg by mouth 2 (two) times a day.        hydrochlorothiazide (MICROZIDE) 12.5 mg capsule Take 12.5 mg by mouth daily.       levothyroxine (SYNTHROID, LEVOTHROID) 75 MCG tablet Take 75 mcg by mouth daily.       lisinopril (PRINIVIL,ZESTRIL) 10 MG tablet Take 10 mg by mouth daily.       omeprazole 20 mg TbEC Take 20 mg by mouth daily before breakfast.        polyethylene glycol (MIRALAX) 17 gram packet Take 17 g by mouth daily.       traZODone (DESYREL) 50 MG tablet Take 50 mg by mouth. Per Dr. Loomis       venlafaxine (EFFEXOR XR) 75 MG 24 hr capsule Take 1 capsule (75 mg total) by mouth daily. 90 capsule 1     cyanocobalamin 1,000 mcg/mL injection Inject 1,000 mcg into the shoulder, thigh, or buttocks once. Per Dr. Loomis       No current facility-administered medications for this visit.          Family history/Social history   Lives with . Reports no physical abuse.  Improved relationships with improvement of her mental health symptoms..           Procedures:  1. Coordination of care: nursing notes, vital signs, multiple records of communication between the patient and the clinic, communication with the pharmacy, and multiple medication orders were reviewed signed and discussed with the patient. Multiple chart and Epic entries were reviewed and new Epic entries were submitted in preparation of documentation and generation of visit pertinent documentation.  Multiple related orders were entered.  2.  Education: was provided on diagnosis,  "medication regimen, psychotherapeutic treatment modalities, future follow-up appointments, recommended to continue medications without change for now due to severity of previous exacerbations.  3.  Counseling: was provided on coping with mental illness and caretaker's burden.   4.  Coordination of care: I personally coordinated all medication orders, follow up orders, pharmacy requests, and provided the patient with detailed instructions in native Sudanese language.  5. Sudanese cultural and immigration issues were addressed, as it is common in Sudanese speaking community to take Russian produced medications which could be dangerous. she reports no use of herbals or other Sudanese Adventism folk remedies at this time.     Review Of Systems:  As above. Also disturbing dreams. The reminder of 10 systems was negative.    LABORATORY DATE:  reviewed normal u/a, CBC, BMP on April 1st..    Vital Signs:    /63 (Patient Site: Left Arm, Patient Position: Sitting, Cuff Size: Adult Large)  Pulse 74  Temp 97.5  F (36.4  C) (Oral)   Ht 5' (1.524 m)  Wt 165 lb (74.8 kg)  BMI 32.22 kg/m2    Mental Status Examination:     AppearancePleasant    Alert and oriented ×3    Attention and concentration  normal    Speech  Normal, except a bit hyperverbal which is known to be patient's baseline, fully directable    Orientation  X 3    Mood  described as \" doing well\"    Affect bit blunted    Thought processing  Logical     Associations  Logical     Thought content  Future oriented    Language  Normal in Sudanese    Short term memory   normal        Long term memory      normal    Fund of knowledge     normal    Psychomotor activity  normal    Gait and station  stable    Insight and judgment  improved        Limited Physical Examination:  The patient is not in acute physical distress, calm, stable on her feet, skin clear.      Laboratory Data:    personally reviewed.   No visits with results within 2 Month(s) from this visit.  Latest known " visit with results is:    Lab on 05/25/2017   Component Date Value     Bilirubin, Total 05/25/2017 0.3      Bilirubin, Direct 05/25/2017 0.1      Protein, Total 05/25/2017 7.2      Albumin 05/25/2017 3.9      Alkaline Phosphatase 05/25/2017 70      AST 05/25/2017 16      ALT 05/25/2017 18      WBC 05/25/2017 8.7      RBC 05/25/2017 4.42      Hemoglobin 05/25/2017 13.6      Hematocrit 05/25/2017 43.2      MCV 05/25/2017 98      MCH 05/25/2017 30.8      MCHC 05/25/2017 31.5*     RDW 05/25/2017 12.8      Platelets 05/25/2017 300      MPV 05/25/2017 10.4      Neutrophils % 05/25/2017 59      Lymphocytes % 05/25/2017 30      Monocytes % 05/25/2017 9      Eosinophils % 05/25/2017 2      Basophils % 05/25/2017 0      Neutrophils Absolute 05/25/2017 5.1      Lymphocytes Absolute 05/25/2017 2.6      Monocytes Absolute 05/25/2017 0.8      Eosinophils Absolute 05/25/2017 0.2      Basophils Absolute 05/25/2017 0.0          Diagnosis:  No past medical history on file.    Patient Active Problem List   Diagnosis     Major depressive disorder, recurrent episode, moderate     Anxiety state, unspecified     Other dysfunctions of sleep stages or arousal from sleep     Bipolar 1 disorder, mixed, severe     Mood disorder in conditions classified elsewhere      bipolar disorder, in remission at this time    cognitive disorder NOS, likely due to side effects of long-term use of Klonopin, resolved with discontinuation of Klonopin   high risk medication use             Treatment Plan:  1. The patient was provided with education and detailed written and verbal instructions in native language on diagnosis, future follow-up, and treatment plan, same instructions were also provided in English language.   2. Instructed to return to clinic in 6 months or sooner if needed.  3. Nurse only clinic  if necessary .  4. Crisis plan in place.  5. Continue Adult Day Care Serbian speaking and culturally sensitive program [facility].  6.    Referral to  Belizean-speaking culturally sensitive psychotherapist doctor Jorge, continue sessions   7. No changes in Effexor XR 75 mg - risk of precipitation of derek was discussed as reflected above        Complex visit, multiple issues were addressed as reflected above, coordination of care, education, counseling,  Total time 40 minutes, more than 50% coordination of care.   Belizean cultural issues, multiple visit entries were reviewed and submitted for generation  of pertinent documentation, as fully reflected in the procedures paragraph. Please see associated nursing records for other details.        This note was created with help of Dragon dictation system. Grammatical / typing errors are not intentional.    Mili Rasmussen MD

## 2021-06-16 NOTE — PROGRESS NOTES
Pt is here for culturally sensitive psychiatric med management follow up. Geoff reports stable mood. Got prescription for Trazodone from her primary doctor, said Melatonin did not work.    Zhui Xin [place] Date: 18  Query Report Page#: 1  Patient Rx History Report  YOVANI MENDEZ  Search Criteria: Last Name 'Yovani' and First Name 'Alireza' and  = ' and Request Period =   to ' - 2 out of 2 Recipients Selected.  *N/R N=New R=Refill  +MED Daily  Patients that match search criteria  ----------------------------------------------------------------------------------------------------------------------------------  [id number] YOVANI SCHWARTZRITOIL,  38; [address], [place] [address]  [id number] YOVANI SCHWARTZRITOIL,  38; [address], [place] [address]  **Per CDC guidance, the conversion factors and associated daily morphine milligram equivalents for drugs prescribed as part of  medication-assisted treatment for opioid use disorder should not be used to benchmark against dosage thresholds meant for opioids  prescribed for pain.    Correct pharmacy verified with patient and confirmed in snapshot? [x] yes []no    Charge captured ? [] yes  [x] no    Medications Phoned  to Pharmacy [] yes [x]no  Name of Pharmacist:  List Medications, including dose, quantity and instructions      Medication Prescriptions given to patient   [] yes  [x] no   List the name of the drug the prescription was written for.       Medications ordered this visit were e-scribed.  Verified by order class [x] yes  [] no  Effexor XR 75 mg   Medication changes or discontinuations were communicated to patient's pharmacy: [] yes  [x] no    UA collected [] yes  [x] no    [place] Prescription Monitoring Program Reviewed? [x] yes  [] no    Referrals were made to:  none  Future appointment was made: [x] yes  [] no  18  Dictation completed at time of chart check: [x] yes  [] no    I  have checked the documentation for today s encounters and the above information has been reviewed and completed.

## 2021-06-20 NOTE — PROGRESS NOTES
OUTPATIENT PROGRESS NOTE      Date of visit September 11, 2018           Reasons For Visit Are Multiple Today: culturally sensitive follow up for multiple issues  1. Stressor:  passed away in June 2018  2.  Grief and loss counselling  3.  Education on treatment plan  4.  Education on  Follow up appointments  5.  Symptoms of bipolar disorder: Stable at this time  6. Family relationships and family issues: the patient reflects at length on recent graduation of her granddaughter, she reflects at length on this event which is important in [place] Mormon culture  7.  Ghanaian and Mormon cultural issues, interview in Ghanaian language, she is going to [facility] to visit her sister in October, [facility]  8.  Current medications: she is on Effexor and Trazodone, asymptomatic, and does not want any mood stabilizer            History of present illness/Subjective:  The patient is Ghanaian speaking. The interview is conducted in Ghanaian language, translated personally by me into English records which adds additional element of complexity to this visit and assessment. Issues are reported and discussed as above. She reflects at length on loss of her   so I allowed the patient to process it and provided with supportive interaction and grief counselling.  She denies any sustained mood symptoms, denies depression, anxiety.  No behavioral or  stated evidence of psychiatric symptoms at this time. She says that she is doing quite well. Supported by family. Going to visit her sister in [facility].    Medications:      Current Outpatient Prescriptions   Medication Sig Dispense Refill     aspirin 81 MG EC tablet Take 81 mg by mouth daily with supper. In the evening per the patient.       atorvastatin (LIPITOR) 40 MG tablet Take 40 mg by mouth.       cholecalciferol, vitamin D3, 1,000 unit tablet Take 1,000 Units by mouth daily.       docusate sodium (COLACE) 100 MG capsule Take 100 mg by mouth 2 (two) times a day.         hydrochlorothiazide (MICROZIDE) 12.5 mg capsule Take 12.5 mg by mouth daily.       levothyroxine (SYNTHROID, LEVOTHROID) 75 MCG tablet Take 75 mcg by mouth daily.       lisinopril (PRINIVIL,ZESTRIL) 10 MG tablet Take 10 mg by mouth daily.       polyethylene glycol (MIRALAX) 17 gram packet Take 17 g by mouth daily.       traZODone (DESYREL) 50 MG tablet Take 50 mg by mouth. Per Dr. Loomis       venlafaxine (EFFEXOR XR) 75 MG 24 hr capsule Take 1 capsule (75 mg total) by mouth daily. 90 capsule 1     cyanocobalamin 1,000 mcg/mL injection Inject 1,000 mcg into the shoulder, thigh, or buttocks once. Per Dr. Loomis       No current facility-administered medications for this visit.          Family history/Social history   Lives with . Reports no physical abuse.  Improved relationships with improvement of her mental health symptoms..           Procedures:  1. Coordination of care: nursing notes, vital signs, multiple records of communication between the patient and the clinic, communication with the pharmacy, and multiple medication orders were reviewed signed and discussed with the patient. Multiple chart and Epic entries were reviewed and new Epic entries were submitted in preparation of documentation and generation of visit pertinent documentation.  Multiple related orders were entered.  2.  Education: was provided on diagnosis, medication regimen, psychotherapeutic treatment modalities, future follow-up appointments, recommended to continue medications without change for now due to severity of previous exacerbations.  3.  Counseling: was provided on coping and grief and loss   4.  Coordination of care: I personally coordinated all medication orders, follow up orders, pharmacy requests, and provided the patient with detailed instructions in native German language.  5. German cultural and immigration issues were addressed, as reflected above     Review Of Systems:  As above. Also disturbing dreams. The  "reminder of 10 systems was negative.    LABORATORY DATE:  reviewed : no labs since last visit    Vital Signs:    /63 (Patient Site: Left Arm, Patient Position: Sitting, Cuff Size: Adult Regular)  Pulse 65  Temp 98  F (36.7  C) (Oral)   Ht 5' (1.524 m)  Wt 167 lb (75.8 kg)  BMI 32.61 kg/m2    Mental Status Examination:     Appearance Pleasant    Alert and oriented ×3    Attention and concentration  normal    Speech  Normal, except a bit hyperverbal which is known to be patient's baseline, fully directable    Orientation  X 3    Mood  described as \" doing well\"    Affect full neutral    Thought processing  Logical     Associations  Logical     Thought content  Future oriented    Language  Normal in Lao    Short term memory   normal        Long term memory      normal    Fund of knowledge     normal    Psychomotor activity  normal    Gait and station  stable    Insight and judgment  normal        Limited Physical Examination:  The patient is not in acute physical distress, calm, stable on her feet, skin clear.      Laboratory Data:    personally reviewed.   No visits with results within 2 Month(s) from this visit.  Latest known visit with results is:    Lab on 05/25/2017   Component Date Value     Bilirubin, Total 05/25/2017 0.3      Bilirubin, Direct 05/25/2017 0.1      Protein, Total 05/25/2017 7.2      Albumin 05/25/2017 3.9      Alkaline Phosphatase 05/25/2017 70      AST 05/25/2017 16      ALT 05/25/2017 18      WBC 05/25/2017 8.7      RBC 05/25/2017 4.42      Hemoglobin 05/25/2017 13.6      Hematocrit 05/25/2017 43.2      MCV 05/25/2017 98      MCH 05/25/2017 30.8      MCHC 05/25/2017 31.5*     RDW 05/25/2017 12.8      Platelets 05/25/2017 300      MPV 05/25/2017 10.4      Neutrophils % 05/25/2017 59      Lymphocytes % 05/25/2017 30      Monocytes % 05/25/2017 9      Eosinophils % 05/25/2017 2      Basophils % 05/25/2017 0      Neutrophils Absolute 05/25/2017 5.1      Lymphocytes Absolute " 05/25/2017 2.6      Monocytes Absolute 05/25/2017 0.8      Eosinophils Absolute 05/25/2017 0.2      Basophils Absolute 05/25/2017 0.0          Diagnosis:  No past medical history on file.    Patient Active Problem List   Diagnosis     Anxiety state, unspecified     Other dysfunctions of sleep stages or arousal from sleep      bipolar disorder, in remission at this time    cognitive disorder NOS, likely due to side effects of long-term use of Klonopin, resolved with discontinuation of Klonopin   high risk medication use             Treatment Plan:  1. The patient was provided with education and detailed written and verbal instructions in native language on diagnosis, future follow-up, and treatment plan, same instructions were also provided in English language.   2. Instructed to return to clinic in 6 months or sooner if needed.  3. Nurse only clinic  if necessary .  4. Crisis plan in place.  5. Continue Adult Day Care Angolan speaking and culturally sensitive program [facility] 3/week.  6.    Referral to Russian-speaking culturally sensitive psychotherapist doctor Patel, continue sessions   7. No changes in Effexor XR 75 mg - risk of precipitation of derek was discussed as reflected above  8. Continue Trazodone as per PMD prescription      Complex visit, multiple issues were addressed as reflected above, coordination of care, education, counseling,  Total time 40 minutes, more than 50% coordination of care and grief counseling today.   Angolan Sabianism cultural issues, multiple visit entries were reviewed and submitted for generation  of pertinent documentation, as fully reflected in the procedures paragraph. Please see associated nursing records for other details.        This note was created with help of Dragon dictation system. Grammatical / typing errors are not intentional.    Mili Rasmussen MD

## 2021-06-20 NOTE — PROGRESS NOTES
Pt is here for culturally sensitive psychiatric med management follow up. Client is adjusting to loss of her . She reports stable mood, sleep is at baseline - vivid dreams as usual. She attends the adult day care x 3 /month.     Correct pharmacy verified with patient and confirmed in snapshot? [x] yes []no    Charge captured ? [x] yes  [] no    Medications Phoned  to Pharmacy [] yes [x]no  Name of Pharmacist:  List Medications, including dose, quantity and instructions      Medication Prescriptions given to patient   [] yes  [x] no   List the name of the drug the prescription was written for.       Medications ordered this visit were e-scribed.  Verified by order class [x] yes  [] no  Effexor XR 75 mg  Medication changes or discontinuations were communicated to patient's pharmacy: [] yes  [x] no    UA collected [] yes  [x] no    [place] Prescription Monitoring Program Reviewed? [] yes  [x] no    Referrals were made to:  none    Future appointment was made: [] yes  [x] no  Left VM for pt to contact us to set up a f/u appt with provider  Dictation completed at time of chart check: [x] yes  [] no    I have checked the documentation for today s encounters and the above information has been reviewed and completed.

## 2021-06-24 NOTE — PROGRESS NOTES
"OUTPATIENT PROGRESS NOTE      Date of visit 2019    [place] name  Alireza Kenyon    Reasons For Visit Are Multiple Today:   1.  Culturally sensitive follow-up for mental health issues  2. Depression: no symptoms   3. Anxiety : no symptoms  4. Insomnia : no symptoms, Trazodone helps  5. ARETHA inventory: score 7, but the patient denies all symptoms pertinent to anxiety  6. PHQ9 inventory score 7, but the patient denies all symptoms pertinent for depression  7. Argentine cultural issues: higher scores of inventories are likely due to the \"meaning lost in translation\"  8.  Grief and loss issues:  of 59 years  last year, the patient has completed grief process and doing well in this regard at this time  9.  Ego activities: She attends Argentine-speaking adult  3-4 times per week  10.  Renewal of prescriptions  11.  Complaint of constipation: We discussed natural treatment modalities, see below, the patient occasionally takes MiraLAX and Dulcolax, limited response today  12.  Education on future follow-up appointments        History of present illness/Subjective:  The patient is Argentine speaking. The interview is conducted in Argentine language, translated personally by me into English records which adds additional element of complexity to this visit and my assessment.  The patient is doing very well.  She is nicely dressed and groomed in typical [place] attire.  She is adequately engaging.  She denies side effects with medications.  She does not want to change her regimen.  She has pleasant demeanor.  She is very attentive, bright, with excellent memory.  She is a super ager.        Medications:      Current Outpatient Medications   Medication Sig Dispense Refill     aspirin 81 MG EC tablet Take 81 mg by mouth daily with supper. In the evening per the patient.       atorvastatin (LIPITOR) 40 MG tablet Take 40 mg by mouth.       cholecalciferol, vitamin D3, 1,000 unit tablet Take 1,000 " Units by mouth daily.       cyanocobalamin 1,000 mcg/mL injection Inject 1,000 mcg into the shoulder, thigh, or buttocks once. Per Dr. Loomis       docusate sodium (COLACE) 100 MG capsule Take 100 mg by mouth 2 (two) times a day.        hydrochlorothiazide (MICROZIDE) 12.5 mg capsule Take 12.5 mg by mouth daily.       levothyroxine (SYNTHROID, LEVOTHROID) 75 MCG tablet Take 75 mcg by mouth daily.       lisinopril (PRINIVIL,ZESTRIL) 10 MG tablet Take 10 mg by mouth daily.       polyethylene glycol (MIRALAX) 17 gram packet Take 17 g by mouth daily.       traZODone (DESYREL) 50 MG tablet Take 50 mg by mouth. Per Dr. Loomis       venlafaxine (EFFEXOR XR) 75 MG 24 hr capsule Take 1 capsule (75 mg total) by mouth daily. 90 capsule 1     No current facility-administered medications for this visit.          Family history/Social history  She lives alone.  She receives PCA services daily.          Procedures: Complex visit as multiple issues were addressed, total of  12 .  Total time today is 25 minutes, face to face and direct hands on patient's care in the clinic, more than 50% of time was coordination of care.   1. Coordination of care: nursing notes, vital signs,  communication with the pharmacy, and  medication orders were reviewed signed and discussed with the patient. Multiple chart entries were reviewed in preparation of documentation and generation of documentation.  2.  Education: was provided on diagnosis, medication regimen, psychotherapeutic treatment modalities, future follow-up appointments.  3.  Counseling: was provided on coping with mental illness.  4.  Coordination of care: I personally coordinated all medication orders, follow up orders, pharmacy requests, and provided the patient with detailed instructions in Iranian language  5. Iranian cultural and immigration issues were addressed, the interview was conducted in Iranian language, Iranian medications were discussed as it is common in Iranian speaking  community to take Russian produced medications which could be dangerous. The patient takes none       Review Of Systems:  As above.   The reminder of 10 systems was negative.      Vital Signs:    /68 (Patient Site: Left Arm, Patient Position: Sitting, Cuff Size: Adult Regular)   Pulse 68   Temp 98  F (36.7  C) (Oral)   Ht 5' (1.524 m)   Wt 170 lb (77.1 kg)   BMI 33.20 kg/m      Mental Status Examination:     Appearance   Pleasant, nicely groomed.  Alert and oriented x3.  Alert and oriented ×3    Speech  fluent    Mood  good    Affect pleasant    Thought processing logical associations tight, thought content no SI/HI/psychosis.  No pathology.  Language normal.  Excellent short-term and long-term memory.  Fund of knowledge normal.  Psychomotor activity normal.  Gait and station are stable.  Insight and judgment are normal.        Laboratory Data:     personally reviewed.   No visits with results within 2 Month(s) from this visit.   Latest known visit with results is:   Lab on 05/25/2017   Component Date Value     Bilirubin, Total 05/25/2017 0.3      Bilirubin, Direct 05/25/2017 0.1      Protein, Total 05/25/2017 7.2      Albumin 05/25/2017 3.9      Alkaline Phosphatase 05/25/2017 70      AST 05/25/2017 16      ALT 05/25/2017 18      WBC 05/25/2017 8.7      RBC 05/25/2017 4.42      Hemoglobin 05/25/2017 13.6      Hematocrit 05/25/2017 43.2      MCV 05/25/2017 98      MCH 05/25/2017 30.8      MCHC 05/25/2017 31.5*     RDW 05/25/2017 12.8      Platelets 05/25/2017 300      MPV 05/25/2017 10.4      Neutrophils % 05/25/2017 59      Lymphocytes % 05/25/2017 30      Monocytes % 05/25/2017 9      Eosinophils % 05/25/2017 2      Basophils % 05/25/2017 0      Neutrophils Absolute 05/25/2017 5.1      Lymphocytes Absolute 05/25/2017 2.6      Monocytes Absolute 05/25/2017 0.8      Eosinophils Absolute 05/25/2017 0.2      Basophils Absolute 05/25/2017 0.0          Diagnosis:  No past medical history on file.    Patient  Active Problem List   Diagnosis     Anxiety state, unspecified     Other dysfunctions of sleep stages or arousal from sleep       Visit diagnosis:  Bipolar disorder, in remission  Insomnia, stable on trazodone  Constipation      Treatment Plan:  1. The patient was provided with education and detailed written and verbal instructions in native language on diagnosis, future follow-up, and treatment plan, same instructions were also provided in English language.   2. Instructed to return to clinic in 6 months or sooner if needed.  3. Nurse only clinic is available in the interim if needed.  4. Crisis plan in place.  5.  We will add Metamucil twice daily and flaxseed twice daily for constipation  6.  Follow-up with primary physician as previously scheduled Dr. Loomis on March 8  7.  We again discussed the risk of precipitation of derek when an antidepressant medication is used in bipolar depression unopposed with a mood stabilizer, but the patient continues to decline a mood stabilizing medication, she has been doing on this combination very well for at least couple of years, so we decided to continue it for now, and crisis plan was discussed, she will call the clinic in case of recurrence of symptoms of derek or mixed symptoms          This note was created by yolanda using a Dragon dictation system. All typing errors or contextual distortion are unintentional and software inherent.     Mili Rasmussen MD

## 2021-06-24 NOTE — PROGRESS NOTES
Pt is here for culturally sensitive psychiatric med management follow up. Client says she is doing well mentally. She is complaining of having difficulties with BM and still having a lot of dreams. Client goes to adult day care three times a week.    Correct pharmacy verified with patient and confirmed in snapshot? [x] yes []no    Charge captured ? [x] yes  [] no    Medications Phoned  to Pharmacy [] yes [x]no  Name of Pharmacist:  List Medications, including dose, quantity and instructions      Medication Prescriptions given to patient   [] yes  [x] no   List the name of the drug the prescription was written for.       Medications ordered this visit were e-scribed.  Verified by order class [x] yes  [] no  Effexor, Metamucil, and Flax seed  Medication changes or discontinuations were communicated to patient's pharmacy: [] yes  [x] no    UA collected [] yes  [x] no    [place] Prescription Monitoring Program Reviewed? [] yes  [x] no  Unable to review due to no access. Provider needs to re enroll to  and/or delegate access  Referrals were made to:  none  Future appointment was made: [x] yes  [] no  9/17/19  Dictation completed at time of chart check: [x] yes  [] no    I have checked the documentation for today s encounters and the above information has been reviewed and completed.

## 2021-06-25 NOTE — TELEPHONE ENCOUNTER
Spoke to the patient and informed that Dr. Rasmussen's FARTUN has been extended thought the end of August ( possible longer).  Cancelled the appt on 6/22/21  Last appt with Dr. Rasumssen: 8/18/20  As a result of the extended FARTUN  offered assistance in establishing care with another provider :  Patient declined, will continue to follow up with Dr. Loomis

## 2021-07-03 NOTE — ADDENDUM NOTE
Addendum Note by Loly Newton MD at 5/26/2017 11:28 AM     Author: Loly Newton MD Service: -- Author Type: Physician    Filed: 5/26/2017 11:28 AM Encounter Date: 5/25/2017 Status: Signed    : Loly Newton MD (Physician)    Addended by: LOLY NEWTON on: 5/26/2017 11:28 AM        Modules accepted: Orders

## 2021-07-03 NOTE — ADDENDUM NOTE
Addendum Note by Barb Silverio LPN at 5/25/2017  3:33 PM     Author: Barb Silverio LPN Service: -- Author Type: Licensed Nurse    Filed: 5/25/2017  3:33 PM Encounter Date: 5/25/2017 Status: Signed    : Barb Silverio LPN (Licensed Nurse)    Addended by: BARB SILVERIO on: 5/25/2017 03:33 PM        Modules accepted: Orders